# Patient Record
Sex: MALE | Race: WHITE | Employment: OTHER | URBAN - NONMETROPOLITAN AREA
[De-identification: names, ages, dates, MRNs, and addresses within clinical notes are randomized per-mention and may not be internally consistent; named-entity substitution may affect disease eponyms.]

---

## 2019-09-27 ENCOUNTER — HOSPITAL ENCOUNTER (OUTPATIENT)
Dept: CT IMAGING | Age: 59
Discharge: HOME | End: 2019-09-27
Payer: COMMERCIAL

## 2019-09-27 VITALS
HEART RATE: 52 BPM | TEMPERATURE: 98 F | BODY MASS INDEX: 33.62 KG/M2 | RESPIRATION RATE: 20 BRPM | SYSTOLIC BLOOD PRESSURE: 114 MMHG | HEIGHT: 69 IN | DIASTOLIC BLOOD PRESSURE: 62 MMHG | WEIGHT: 227 LBS | OXYGEN SATURATION: 100 %

## 2019-09-27 DIAGNOSIS — D47.2 MONOCLONAL GAMMOPATHIES, BENIGN: ICD-10-CM

## 2019-09-27 LAB
HCT VFR BLD CALC: 42.4 % (ref 42–52)
HEMOGLOBIN: 13.7 G/DL (ref 14–18)
INR BLD: 1.57 (ref 0.88–1.18)
MCH RBC QN AUTO: 32 PG (ref 27–31)
MCHC RBC AUTO-ENTMCNC: 32.3 G/DL (ref 33–37)
MCV RBC AUTO: 99.1 FL (ref 80–94)
PDW BLD-RTO: 14.6 % (ref 11.5–14.5)
PLATELET # BLD: 74 K/UL (ref 130–400)
PMV BLD AUTO: 11.3 FL (ref 9.4–12.4)
PROTHROMBIN TIME: 18.1 SEC (ref 12–14.6)
RBC # BLD: 4.28 M/UL (ref 4.7–6.1)
WBC # BLD: 4.6 K/UL (ref 4.8–10.8)

## 2019-09-27 PROCEDURE — 88311 DECALCIFY TISSUE: CPT

## 2019-09-27 PROCEDURE — 88184 FLOWCYTOMETRY/ TC 1 MARKER: CPT

## 2019-09-27 PROCEDURE — 85610 PROTHROMBIN TIME: CPT

## 2019-09-27 PROCEDURE — 2709999900 CT BIOPSY BONE MARROW

## 2019-09-27 PROCEDURE — 88185 FLOWCYTOMETRY/TC ADD-ON: CPT

## 2019-09-27 PROCEDURE — 88313 SPECIAL STAINS GROUP 2: CPT

## 2019-09-27 PROCEDURE — 6360000002 HC RX W HCPCS: Performed by: RADIOLOGY

## 2019-09-27 PROCEDURE — 88377 M/PHMTRC ALYS ISHQUANT/SEMIQ: CPT

## 2019-09-27 PROCEDURE — 88305 TISSUE EXAM BY PATHOLOGIST: CPT

## 2019-09-27 PROCEDURE — 85027 COMPLETE CBC AUTOMATED: CPT

## 2019-09-27 RX ORDER — FENTANYL CITRATE 50 UG/ML
INJECTION, SOLUTION INTRAMUSCULAR; INTRAVENOUS
Status: COMPLETED | OUTPATIENT
Start: 2019-09-27 | End: 2019-09-27

## 2019-09-27 RX ORDER — LIDOCAINE HYDROCHLORIDE 10 MG/ML
INJECTION, SOLUTION EPIDURAL; INFILTRATION; INTRACAUDAL; PERINEURAL
Status: DISPENSED
Start: 2019-09-27 | End: 2019-09-27

## 2019-09-27 RX ORDER — SODIUM CHLORIDE 0.9 % (FLUSH) 0.9 %
10 SYRINGE (ML) INJECTION PRN
Status: DISCONTINUED | OUTPATIENT
Start: 2019-09-27 | End: 2019-09-29 | Stop reason: HOSPADM

## 2019-09-27 RX ORDER — MIDAZOLAM HYDROCHLORIDE 1 MG/ML
INJECTION INTRAMUSCULAR; INTRAVENOUS
Status: DISPENSED
Start: 2019-09-27 | End: 2019-09-27

## 2019-09-27 RX ORDER — ALPRAZOLAM 0.5 MG/1
0.5 TABLET ORAL NIGHTLY PRN
COMMUNITY

## 2019-09-27 RX ORDER — FENTANYL CITRATE 50 UG/ML
INJECTION, SOLUTION INTRAMUSCULAR; INTRAVENOUS
Status: DISPENSED
Start: 2019-09-27 | End: 2019-09-27

## 2019-09-27 RX ORDER — MIDAZOLAM HYDROCHLORIDE 1 MG/ML
INJECTION INTRAMUSCULAR; INTRAVENOUS
Status: COMPLETED | OUTPATIENT
Start: 2019-09-27 | End: 2019-09-27

## 2019-09-27 RX ADMIN — Medication 25 MCG: at 10:20

## 2019-09-27 RX ADMIN — MIDAZOLAM HYDROCHLORIDE 1 MG: 1 INJECTION, SOLUTION INTRAMUSCULAR; INTRAVENOUS at 10:20

## 2019-09-27 ASSESSMENT — PAIN - FUNCTIONAL ASSESSMENT: PAIN_FUNCTIONAL_ASSESSMENT: 0-10

## 2019-09-27 NOTE — PROGRESS NOTES
Added allergies to patient chart. Patient states he is allergic to amoxicillin that it gives him a rash. Added the allergy to the History Sheet for the Radiologist Dr Wai Patiño to review.